# Patient Record
Sex: MALE | Race: WHITE
[De-identification: names, ages, dates, MRNs, and addresses within clinical notes are randomized per-mention and may not be internally consistent; named-entity substitution may affect disease eponyms.]

---

## 2018-03-19 ENCOUNTER — TRANSCRIPTION ENCOUNTER (OUTPATIENT)
Age: 43
End: 2018-03-19

## 2021-05-12 ENCOUNTER — FORM ENCOUNTER (OUTPATIENT)
Age: 46
End: 2021-05-12

## 2022-05-27 ENCOUNTER — NON-APPOINTMENT (OUTPATIENT)
Age: 47
End: 2022-05-27

## 2022-07-16 ENCOUNTER — NON-APPOINTMENT (OUTPATIENT)
Age: 47
End: 2022-07-16

## 2022-10-25 ENCOUNTER — APPOINTMENT (OUTPATIENT)
Dept: PODIATRY | Facility: CLINIC | Age: 47
End: 2022-10-25

## 2022-10-25 DIAGNOSIS — I10 ESSENTIAL (PRIMARY) HYPERTENSION: ICD-10-CM

## 2022-10-25 DIAGNOSIS — M06.9 RHEUMATOID ARTHRITIS, UNSPECIFIED: ICD-10-CM

## 2022-10-25 DIAGNOSIS — S93.401A SPRAIN OF UNSPECIFIED LIGAMENT OF RIGHT ANKLE, INITIAL ENCOUNTER: ICD-10-CM

## 2022-10-25 DIAGNOSIS — G62.9 POLYNEUROPATHY, UNSPECIFIED: ICD-10-CM

## 2022-10-25 DIAGNOSIS — M25.571 PAIN IN RIGHT ANKLE AND JOINTS OF RIGHT FOOT: ICD-10-CM

## 2022-10-25 PROCEDURE — 73610 X-RAY EXAM OF ANKLE: CPT | Mod: RT

## 2022-10-25 PROCEDURE — 99213 OFFICE O/P EST LOW 20 MIN: CPT | Mod: 25

## 2022-10-25 NOTE — REASON FOR VISIT
Detail Level: Generalized [Follow-Up Visit] : a follow-up visit for [Ankle Pain] : ankle pain General Sunscreen Counseling: I recommended a broad spectrum sunscreen with a SPF of 30 or higher.  I explained that SPF 30 sunscreens block approximately 97 percent of the sun's harmful rays.  Sunscreens should be applied at least 15 minutes prior to expected sun exposure and then every 2 hours after that as long as sun exposure continues. If swimming or exercising sunscreen should be reapplied every 45 minutes to an hour after getting wet or sweating.  One ounce, or the equivalent of a shot glass full of sunscreen, is adequate to protect the skin not covered by a bathing suit. I also recommended a lip balm with a sunscreen as well. Sun protective clothing can be used in lieu of sunscreen but must be worn the entire time you are exposed to the sun's rays.

## 2022-10-26 DIAGNOSIS — Z78.9 OTHER SPECIFIED HEALTH STATUS: ICD-10-CM

## 2022-10-26 DIAGNOSIS — Z82.61 FAMILY HISTORY OF ARTHRITIS: ICD-10-CM

## 2022-10-26 DIAGNOSIS — M76.821 POSTERIOR TIBIAL TENDINITIS, RIGHT LEG: ICD-10-CM

## 2022-10-26 RX ORDER — AMLODIPINE AND OLMESARTAN MEDOXOMIL 10; 20 MG/1; MG/1
10-20 TABLET ORAL
Qty: 90 | Refills: 0 | Status: ACTIVE | COMMUNITY
Start: 2022-10-21

## 2022-10-28 PROBLEM — M25.571 ACUTE RIGHT ANKLE PAIN: Status: ACTIVE | Noted: 2022-10-26

## 2022-10-28 PROBLEM — M06.9: Status: ACTIVE | Noted: 2022-10-26

## 2022-10-28 PROBLEM — G62.9 PERIPHERAL NEUROPATHY: Status: ACTIVE | Noted: 2022-10-26

## 2022-10-28 PROBLEM — S93.401A SPRAIN OF ANKLE, RIGHT: Status: ACTIVE | Noted: 2022-10-26

## 2022-10-28 NOTE — ASSESSMENT
[FreeTextEntry1] : \par Impression: Peripheral neuropathy. Lateral ankle sprain, right. Possible rheumatoid arthritic flare-up.\par \par Treatment: I want him to rest and ice it. He is going to use Compressogrip for support for the next week. He will take Advil BID with food only. With continued pain that persists he will follow-up in the office.

## 2022-10-28 NOTE — PHYSICAL EXAM
[2+] : left foot dorsalis pedis 2+ [Vibration Dec.] : diminished vibratory sensation at the level of the toes [Position Sense Dec.] : diminished position sense at the level of the toes [Diminished Throughout Right Foot] : diminished sensation with monofilament testing throughout right foot [Diminished Throughout Left Foot] : diminished sensation with monofilament testing throughout left foot [Delayed in the Right Toes] : capillary refills normal in right toes [Delayed in the Left Toes] : capillary refills normal in the left toes [de-identified] : There is good stability. There is a normal anterior drawer sign. All tendons are noted to be intact. There is no pain medially. No pain over the syndesmosis. There is some pain at the ATF ligament.  [FreeTextEntry1] : Patient with minor swelling about the right ankle.

## 2022-10-28 NOTE — PROCEDURE
[FreeTextEntry1] : X-ray Report: (Right ankle - 3 views) Upon reviewing x-rays he has high average arch. He has inferior and retrocalcaneal spurs. The patient has old 5th metatarsal fracture that is well healed.  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

## 2022-10-28 NOTE — HISTORY OF PRESENT ILLNESS
[FreeTextEntry1] : Patient presents today because of pain at the outside of the right ankle that has been going on for the last week. He has been very active. He doesn't recall twisting it but he does have peripheral neuropathy and he also does have rheumatoid arthritis. \par \par

## 2023-07-24 ENCOUNTER — APPOINTMENT (OUTPATIENT)
Dept: PODIATRY | Facility: CLINIC | Age: 48
End: 2023-07-24
Payer: COMMERCIAL

## 2023-07-24 DIAGNOSIS — M06.9 RHEUMATOID ARTHRITIS, UNSPECIFIED: ICD-10-CM

## 2023-07-24 DIAGNOSIS — M19.072 PRIMARY OSTEOARTHRITIS, LEFT ANKLE AND FOOT: ICD-10-CM

## 2023-07-24 DIAGNOSIS — M25.572 PAIN IN LEFT ANKLE AND JOINTS OF LEFT FOOT: ICD-10-CM

## 2023-07-24 PROCEDURE — 73610 X-RAY EXAM OF ANKLE: CPT | Mod: LT

## 2023-07-24 PROCEDURE — 99212 OFFICE O/P EST SF 10 MIN: CPT | Mod: 25

## 2023-07-24 RX ORDER — MUPIROCIN 20 MG/G
2 OINTMENT TOPICAL
Qty: 1 | Refills: 1 | Status: ACTIVE | COMMUNITY
Start: 2023-07-24 | End: 1900-01-01

## 2023-07-26 PROBLEM — M19.072 ARTHROPATHY OF LEFT ANKLE: Status: ACTIVE | Noted: 2023-07-25

## 2023-07-26 PROBLEM — M06.9: Status: ACTIVE | Noted: 2023-07-25

## 2023-07-26 PROBLEM — M25.572 ANKLE PAIN, LEFT: Status: ACTIVE | Noted: 2023-07-25

## 2023-07-26 NOTE — HISTORY OF PRESENT ILLNESS
[FreeTextEntry1] : Patient presents today for evaluation of left ankle swelling and pain that has been going on for the last week. He does not recall twisting it but it is more swollen then usual. It is non-painful. He had a history of ankle fracture years ago and does see rheumatologist because of arthritis. He also has a right 3rd toe blister that popped. It is dried up and no longer sensitive.

## 2023-07-26 NOTE — PROCEDURE
22-Jun-2021 [FreeTextEntry1] : X-rays taken to evaluate for fracture or arthritis.\par X-ray Report: (Left ankle - 3 views) X-rays demonstrate old healed fracture, good alignment but there is traumatic arthritis or rheumatoid arthritis of the ankle joint.  22-Jun-2021 11:43

## 2023-07-26 NOTE — ASSESSMENT
[FreeTextEntry1] : \par Impression: Rheumatoid arthritis. Traumatic arthropathy. Pain left ankle.\par \par Treatment: It is really nonsymptomatic. He has swelling. It could be his diet over the summer months. I want him to avoid salt. I gave him Compressogrip. I want him to use Jobst stockings to minimize swelling and follow-up with his rheumatologist. No exercise and just mild activity for the next 2 to 3 weeks. With any continued pain or worsening he needs to immediately contact the office. He will follow-up with the rheumatologist. From an arthritic standpoint discussed the possibility of bracing vs. surgery which is not even an option for him. With any issues or worsening he will call the office. Regarding the right 3rd toe I ePrescribed Mupirocin ointment for the patient to use.

## 2023-07-26 NOTE — PHYSICAL EXAM
[2+] : left foot dorsalis pedis 2+ [Sensation] : the sensory exam was normal to light touch and pinprick [No Focal Deficits] : no focal deficits [Deep Tendon Reflexes (DTR)] : deep tendon reflexes were 2+ and symmetric [Motor Exam] : the motor exam was normal [Delayed in the Right Toes] : capillary refills normal in right toes [Delayed in the Left Toes] : capillary refills normal in the left toes [FreeTextEntry3] : Hair growth noted on digits. Proximal to distal cooling is within normal limits.  [de-identified] : There is no pain or instability. There is some limited ankle ROM. [FreeTextEntry1] : He has mild swelling at the left ankle. No erythema or ecchymosis.